# Patient Record
Sex: MALE | Race: WHITE | NOT HISPANIC OR LATINO | URBAN - METROPOLITAN AREA
[De-identification: names, ages, dates, MRNs, and addresses within clinical notes are randomized per-mention and may not be internally consistent; named-entity substitution may affect disease eponyms.]

---

## 2021-02-21 ENCOUNTER — WEB ENCOUNTER (OUTPATIENT)
Dept: URBAN - METROPOLITAN AREA CLINIC 35 | Facility: CLINIC | Age: 41
End: 2021-02-21

## 2021-02-23 ENCOUNTER — OFFICE VISIT (OUTPATIENT)
Dept: URBAN - METROPOLITAN AREA CLINIC 33 | Facility: CLINIC | Age: 41
End: 2021-02-23

## 2021-02-23 ENCOUNTER — DASHBOARD ENCOUNTERS (OUTPATIENT)
Age: 41
End: 2021-02-23

## 2021-02-23 VITALS
WEIGHT: 315 LBS | HEART RATE: 89 BPM | DIASTOLIC BLOOD PRESSURE: 80 MMHG | SYSTOLIC BLOOD PRESSURE: 120 MMHG | OXYGEN SATURATION: 99 % | BODY MASS INDEX: 39.17 KG/M2 | HEIGHT: 75 IN

## 2021-02-23 RX ORDER — SIMETHICONE 180 MG
1 CAPSULE AFTER MEALS AND AT BEDTIME AS NEEDED CAPSULE ORAL TWICE A DAY
Status: ACTIVE | COMMUNITY

## 2021-02-23 RX ORDER — GABAPENTIN 100 MG/1
1 CAPSULE CAPSULE ORAL ONCE A DAY
Qty: 30 | Status: ACTIVE | COMMUNITY

## 2021-02-23 RX ORDER — IBUPROFEN SODIUM 256 MG/1
1 TABLET WITH FOOD OR MILK AS NEEDED TABLET, COATED ORAL THREE TIMES A DAY
Status: ACTIVE | COMMUNITY

## 2021-02-23 RX ORDER — CLONAZEPAM 1 MG/1
1 TABLET TABLET ORAL ONCE A DAY
Status: ACTIVE | COMMUNITY

## 2021-02-23 NOTE — HPI-MIGRATED HPI
;     Abdominal Pain : Patient is a 41 y/o  male who presents for abdominal pain consult. Patient admits he has been experiencing symptoms for the past 14 months. Patient admits that he has had 3-4 episodes of dull pain, gas and nausea perhaps due to the pain he's having. Patient initially felt like his pain was a stomach virus. He now feels like it's a dull, but attention-getting pain.  He states the pain will radiates into his upper back.  Admits that the pain is located in his mid-upper abdominal region.  Patient admits symptoms are present anytime during the day and it can last for hours up to a full day and then resolve on its own. Patient admits to some associated nausea. Patient admits that he is expelling more gas than usual.  Admits relief with Simethicone and a heating pad.  Patient denies having any recent imaging.  He denies any constipation, diarrhea, heartburn.  Patient denies EGD or colonoscopy in the past.;

## 2021-03-04 ENCOUNTER — WEB ENCOUNTER (OUTPATIENT)
Dept: URBAN - METROPOLITAN AREA CLINIC 35 | Facility: CLINIC | Age: 41
End: 2021-03-04

## 2021-03-05 ENCOUNTER — WEB ENCOUNTER (OUTPATIENT)
Dept: URBAN - METROPOLITAN AREA CLINIC 35 | Facility: CLINIC | Age: 41
End: 2021-03-05

## 2021-03-05 ENCOUNTER — TELEPHONE ENCOUNTER (OUTPATIENT)
Dept: URBAN - METROPOLITAN AREA CLINIC 35 | Facility: CLINIC | Age: 41
End: 2021-03-05

## 2021-04-05 ENCOUNTER — OFFICE VISIT (OUTPATIENT)
Dept: URBAN - METROPOLITAN AREA SURGERY CENTER 8 | Facility: SURGERY CENTER | Age: 41
End: 2021-04-05

## 2021-04-20 ENCOUNTER — OFFICE VISIT (OUTPATIENT)
Dept: URBAN - METROPOLITAN AREA CLINIC 33 | Facility: CLINIC | Age: 41
End: 2021-04-20